# Patient Record
(demographics unavailable — no encounter records)

---

## 2025-03-13 NOTE — PLAN
[FreeTextEntry1] : Obesity- diff obtaining wgt- will ref to Wgt management.but appears to be in obese range.  non fasting- Blood was collected in the office.

## 2025-03-13 NOTE — HISTORY OF PRESENT ILLNESS
[FreeTextEntry1] : HTN [de-identified] : exercise- PT.  using wheelchair reviewed 8/1/24 vit D 25.4, nl  PSA. - pt is now taking 1000 units of vit D daily CVA- [facility] 11/2021 pt had hemorrhagic stroke- had entire L side hemiplegia- hospitalized at  - - pt is still getting PT-  mobility better- able transfer. able to walk w walker 50 ft but limited now to due to pressure sore- limited ambulation Sz- pt had episode when he was weaned off Levetiracetam. and restarted on the medication- no further Sz- pt is being f/u by Neuro pt is in a wheelchair.  has a brace on L lower leg- getting botox for leg spasity by another Neuro.  seen Neuro   2 mo ago has HHA.  pt has mild hemiparesis L leg> arm.  L arm strg- 50 % better.  ROM of L shoulder- 75 % better.  needs help bathing, dressing.  needs help w cooking, laundry, housekeeping  pt states he had CT scan showing Pl thickening and granulomas-- states had Pl thickening since 2021- due to occupational risk- pt works w asbestos.  seen  Pulm  yest - . Dr Jernigan  SHON- chg from CPAP to bipap- had Sleeo study in Feb 2025 HTN- for 3 yrs.  reviewed 2/6/25 ECHO- gd 1 diastolic dysf  Cardio - Dr. William Garsia-seen 10 mo ago and has f/u. ordered 2 wk holter-no afib chronic leg edema- had multiple neg venous doppler Gout- last flare 3-4 yrs ago unable to wgt pt- due to pt having diffic standing and his leg brace.